# Patient Record
Sex: FEMALE | Race: WHITE | NOT HISPANIC OR LATINO | Employment: FULL TIME | URBAN - METROPOLITAN AREA
[De-identification: names, ages, dates, MRNs, and addresses within clinical notes are randomized per-mention and may not be internally consistent; named-entity substitution may affect disease eponyms.]

---

## 2018-06-18 ENCOUNTER — OFFICE VISIT (OUTPATIENT)
Dept: ORTHOPEDIC SURGERY | Facility: CLINIC | Age: 23
End: 2018-06-18

## 2018-06-18 VITALS — WEIGHT: 176.37 LBS | OXYGEN SATURATION: 99 % | HEIGHT: 63 IN | HEART RATE: 77 BPM | BODY MASS INDEX: 31.25 KG/M2

## 2018-06-18 DIAGNOSIS — M79.671 RIGHT FOOT PAIN: ICD-10-CM

## 2018-06-18 DIAGNOSIS — M21.6X1 ACQUIRED METATARSUS ADDUCTUS OF RIGHT FOOT: ICD-10-CM

## 2018-06-18 DIAGNOSIS — R52 PAIN: Primary | ICD-10-CM

## 2018-06-18 PROCEDURE — 99204 OFFICE O/P NEW MOD 45 MIN: CPT | Performed by: ORTHOPAEDIC SURGERY

## 2018-06-18 RX ORDER — LORATADINE 10 MG/1
10 TABLET ORAL DAILY
COMMUNITY

## 2018-06-18 NOTE — PROGRESS NOTES
"NEW PATIENT    Patient: Brittani Rashid  : 1995    Primary Care Provider: Wesley Alegre MD    Requesting Provider: As above    Pain of the Right Foot      History    Chief Complaint: right foot pain     History of Present Illness: this is an extremely pleasant 22 year old young woman here with her mother and brother.   She has a long history of bilateral bunions (adolescent) her brother also has bunions.  She had surgery on the right 16 and has not had much correction of the deformity, still has pain.  She has seen the podiatrist again, and they recommended another surgery.  She is here for a second opinion.  She rates the pain as 4/10, aching, \"annoying\"  She works in manufacturing.  She is otherwise healthy, quit smoking 3 years ago.  (excellent, I explained the risks of gangrene, etc)    No current outpatient prescriptions on file prior to visit.     No current facility-administered medications on file prior to visit.       Allergies   Allergen Reactions   • Penicillins Rash      History reviewed. No pertinent past medical history.  Past Surgical History:   Procedure Laterality Date   • FOOT SURGERY Right      -   • TONSILLECTOMY     • WRIST SURGERY Left     cyst removal      Family History   Problem Relation Age of Onset   • Hypertension Mother    • Hypertension Father    • Heart attack Father       Social History     Social History   • Marital status: Single     Spouse name: N/A   • Number of children: N/A   • Years of education: N/A     Occupational History   • Not on file.     Social History Main Topics   • Smoking status: Former Smoker     Packs/day: 0.50     Years: 3.00     Types: Cigarettes     Start date:      Quit date:    • Smokeless tobacco: Never Used   • Alcohol use Yes      Comment: occasional   • Drug use: No   • Sexual activity: Defer     Other Topics Concern   • Not on file     Social History Narrative   • No narrative on file        Review of Systems " "  Constitutional: Negative.    Eyes: Negative.    Respiratory: Negative.    Cardiovascular: Negative.    Gastrointestinal: Negative.    Endocrine: Negative.    Genitourinary: Negative.    Musculoskeletal: Positive for arthralgias and joint swelling.   Skin: Negative.    Allergic/Immunologic: Positive for environmental allergies and food allergies.   Neurological: Positive for headaches.   Hematological: Negative.    Psychiatric/Behavioral: Negative.        The following portions of the patient's history were reviewed and updated as appropriate: allergies, current medications, past family history, past medical history, past social history, past surgical history and problem list.    Physical Exam:   Pulse 77   Ht 160 cm (63\")   Wt 80 kg (176 lb 5.9 oz)   SpO2 99%   BMI 31.24 kg/m²   GENERAL: Body habitus: overweight    Lower extremity edema: Left: none; Right: none    Varicose veins:  Left: none; Right: none    Gait: normal     Mental Status:  awake and alert; oriented to person, place, and time    Voice:  clear  SKIN:  Normal and warm and dry    Hair Growth:  Right:normal; Left:  normal  NAILS: Toenails: normal  HEENT: Head: Normocephalic, atraumatic,  without obvious abnormality.  eye: normal external eye, no icterus  ears: normal external ears  nose: normal external nose  pharynx: dental hygiene adequate  PULM:  Repiratory effort normal  CV:  Dorsalis Pedis:  Right: 2+; Left:2+    Posterior Tibial: Right:2+; Left:2+    Capillary Refill:  Brisk  MSK:  Hand:right handed and sensation intact no significant laxity    Tibia:  Right:  non tender; Left:  non tender      Ankle:  Right: non tender, ROM  normal and symmetric and motor function  normal; Left:  non tender, ROM  normal and symmetric and motor function  normal      Foot:  Right:  healed very long dorsal-medial incision over 1st metatarsal and toe, tender.  great toe has 30 deg dorsiflex, 20 plantar with pain at dorsiflexoin.  bilateral HV/MTPV, angles fairly " symmetric on clinical exam; Left:  moderate HV/MTPV clinically symmetric with right, not tender      NEURO: Heel Walking:  Right:  normal; Left:  normal    Toe Walking:  Right:  normal- some soreness at max dorsiflexion in great toe; Left:  normal     [place]-Citlali 5.07 monofilament test: normal    Lower extremity sensation: intact     Reflexes:  Biceps:  Right:  1+; Left:  1+           Quads:  Right:  3+; Left:  2+           Ankle:  Right:  1+; Left:  1+      Calf Atrophy:none    Motor Function: all 5/5         Medical Decision Making    Data Review:   ordered and reviewed x-rays today and reviewed radiology resultsI reviewed the podiatry records    Assessment and Plan/ Diagnosis/Treatment options:   1. Pain- recurrent HV/MTPV  I reviewed the records and X-rays.  Unfortunately I do not think the deformity was fully corrected.  The technique used was an opening wedge of the 1st metatarsal and Akin of the great toe. The opening wedge was cut at an angle that would not allow  correction, and it does not look like there was any change in the alignment of the 1st metatarsal. The akin looks like it changed the HVIP, but the 1st MTPJ is in valgus, it is not corrected.  There is also a large amount of bone removed from the medial 1st metatarsal head.  The osteotomies look healed, hardware intact.    I had a long discussion with the patient and family.  I explained pediatric/adolescent bunions, the increased risk of recurrence.  I explained the different surgeries used for bunions.  I drew a picture on her foot explained the angles.  I explained that for pediatric/adolecent bunions one has to pick the surgery that gives the best correction.  In her case it does not look like the correction was achieved.  The difficulty now is that there is no easy/good revision.  The podiatrist told her they would do a distal osteotomy, and it looks like they would resect the base of the 1st phalanx.  I do NOT think this would be  "successful.  If it came to revision surgery, I would remove the proximal hardware and do a crescentic osteotomy, then realign the distal joint with soft tissue procedure.  However, this would have a significant rate of recurrence, because of the location of the incision (i would have to go through the existing incision) and the excess resection of the metatarsal head.  The ultimate revision surgery would be a fusion of the 1st MTPJ, but I would not do that in a 22 year old.      Because of the complexity and questionable outcome, I would recommend she put the surgery off as long as possible, her level of pain is fairly low now, it is an \"annoyance\" by her report.  I would not suggest surgery unless it affected her function more.  She and her family understood, questions asked and answered.  I will be happy to see her any time                      "

## 2024-07-08 ENCOUNTER — TRANSCRIBE ORDERS (OUTPATIENT)
Dept: NUTRITION | Facility: HOSPITAL | Age: 29
End: 2024-07-08
Payer: COMMERCIAL

## 2024-07-08 DIAGNOSIS — K51.919 ULCERATIVE COLITIS WITH COMPLICATION, UNSPECIFIED LOCATION: Primary | ICD-10-CM

## 2024-08-13 ENCOUNTER — HOSPITAL ENCOUNTER (OUTPATIENT)
Dept: NUTRITION | Facility: HOSPITAL | Age: 29
Setting detail: RECURRING SERIES
Discharge: HOME | End: 2024-08-13

## 2024-08-13 PROCEDURE — 97802 MEDICAL NUTRITION INDIV IN: CPT

## 2024-08-13 NOTE — CONSULTS
[facility] Nutrition Services          Initial 60 Minute Nutrition Visit    Date: 2024   Patient Name: Brittani Rashid  : 1995   MRN: [id number]   Referring Provider: Himanshu Quick MD    Reason for Visit:   Visit Format: Phone    Nutrition Assessment       Social History:   Social History     Socioeconomic History    Marital status: Single   Tobacco Use    Smoking status: Former     Current packs/day: 0.00     Average packs/day: 0.5 packs/day for 3.0 years (1.5 ttl pk-yrs)     Types: Cigarettes     Start date:      Quit date:      Years since quittin.6    Smokeless tobacco: Never   Substance and Sexual Activity    Alcohol use: Yes     Comment: occasional    Drug use: No    Sexual activity: Defer     Active Problem List:   Patient Active Problem List    Diagnosis     Acquired metatarsus adductus of right foot [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]       Current Medications:   Current Outpatient Medications:     loratadine (CLARITIN) 10 MG tablet, Take 10 mg by mouth Daily., Disp: , Rfl:     Labs: n/a    Hunger Vital Sign Food Insecurity Assessment:  Within the past 12 months I/we worried whether our food would run out before I/we got money to buy more: no   Within the past 12 months the food I/we bought just didn't last and I/we didn't have money to get more: no   Use of food assistance programs (WIC, food stamps, food rdz) no       Food & Nutrition Related History       Food Allergies: n/a  Food Intolerances: n/a  Food Behavior: none  Nutrition Impact Symptoms: abdominal pain  Gastrointestinal conditions that impact intake or food choices: Ulcerative Colitis  Details at home: n/a  Who prepares most meals: patient   Who does grocery shopping: patient   How many meals are purchased from fast food/sit down restaurants per week: 1  Difficulty chewin - Normal  Difficulty swallowin - Normal  Diet requirement related to personal preference or cultural belief:  low fodmap  History of eating  "disorder/disordered eating habits: None  Language/communication details: English  Barriers to learning: No barriers identified at this time        Anthropometrics      Height:   Ht Readings from Last 1 Encounters:   06/18/18 160 cm (63\")     Weight:   Wt Readings from Last 3 Encounters:   06/18/18 80 kg (176 lb 5.9 oz)     BMI: There is no height or weight on file to calculate BMI.   Weight Change: n/a     Physical Activity         Physical activity comments: not discussed     Estimated Needs     Estimated Energy Needs: n/a    Estimated Protein Needs: n/a     Estimated Fluid Needs: n/a     Discussion / Education      Patient is a 28 year old female referred for ulcerative colitis. She states that she was diagnosed with ulcerative colitis back in December of 2019. She states that she has been follow the low fodmap diet for around 2 years now. She states that she still experiences flare ups and has cramping and other GI symptoms. She states that she does not eat a variety of foods. She states that she is scared to eat some foods because of abdominal pain. She states that she is not strict with the low fodmap diet and has not received any education on it previously. She states that she takes a vitamin B12 and D3 supplement due to deficiency.     Education provided today focused on the low fodmap diet. Discussed what the low fodmap diet is and how it improves GI symptoms. Discussed the two phases of the low fodmap diet, elimination phase and the reintroduction phase. Provided patient with a list of foods that are considered to be high and low fodmap foods. Discussed following the elimination phase for 3 weeks and then slowly start reintroducing foods to target specefic foods causing symptoms. Encouraged her to keep a food journal and write down any symptoms that occur. Discussed the importance of hydration and fiber while on the low fodmap diet. Discussed limiting fatty and spicy foods. Discussed if she does not " experience any success with low fodmap diet we will discuss the Mediterranean diet as an alternative. Encouraged patient to reach out with any additional questions or concerns.       Assessment of patient engagement: Engaged    Measurement of understanding: Patient verbalized understanding    Resources Provided: [facility] Fodmap Basic       Goal (s)      Goal 1: Follow the low fodmap diet          Plan of Care     PES Statement:   Food causing negative symptoms related to ulcerative colitis as evidenced by gastrointestinal symptoms.     Follow Up Visit      Follow Up:   October 3 at 4pm    Total of 60 minutes spent with patient on nutrition counseling. Education based on Academy of Nutrition and Dietetics guidelines. Patient was provided with RD's contact information. Thank you for this referral.

## 2025-08-20 ENCOUNTER — OFFICE VISIT (OUTPATIENT)
Dept: INTERNAL MEDICINE | Age: 30
End: 2025-08-20
Payer: COMMERCIAL

## 2025-08-20 ENCOUNTER — LAB (OUTPATIENT)
Dept: INTERNAL MEDICINE | Age: 30
End: 2025-08-20
Payer: COMMERCIAL

## 2025-08-20 VITALS
DIASTOLIC BLOOD PRESSURE: 64 MMHG | HEIGHT: 64 IN | WEIGHT: 191 LBS | OXYGEN SATURATION: 98 % | HEART RATE: 91 BPM | BODY MASS INDEX: 32.61 KG/M2 | SYSTOLIC BLOOD PRESSURE: 90 MMHG

## 2025-08-20 DIAGNOSIS — J45.20 MILD INTERMITTENT ASTHMA WITHOUT COMPLICATION: ICD-10-CM

## 2025-08-20 DIAGNOSIS — E55.9 VITAMIN D DEFICIENCY: ICD-10-CM

## 2025-08-20 DIAGNOSIS — K51.919 ULCERATIVE COLITIS WITH COMPLICATION, UNSPECIFIED LOCATION: Primary | ICD-10-CM

## 2025-08-20 DIAGNOSIS — E53.8 B12 DEFICIENCY: ICD-10-CM

## 2025-08-20 DIAGNOSIS — Z00.00 HEALTHCARE MAINTENANCE: Primary | ICD-10-CM

## 2025-08-20 LAB
ALBUMIN SERPL-MCNC: 4.1 G/DL (ref 3.5–5.2)
ALBUMIN/GLOB SERPL: 1.2 G/DL
ALP SERPL-CCNC: 65 U/L (ref 39–117)
ALT SERPL W P-5'-P-CCNC: 18 U/L (ref 1–33)
ANION GAP SERPL CALCULATED.3IONS-SCNC: 11.8 MMOL/L (ref 5–15)
AST SERPL-CCNC: 18 U/L (ref 1–32)
BILIRUB SERPL-MCNC: 0.4 MG/DL (ref 0–1.2)
BUN SERPL-MCNC: 12 MG/DL (ref 6–20)
BUN/CREAT SERPL: 15.4 (ref 7–25)
CALCIUM SPEC-SCNC: 9.5 MG/DL (ref 8.6–10.5)
CHLORIDE SERPL-SCNC: 105 MMOL/L (ref 98–107)
CO2 SERPL-SCNC: 23.2 MMOL/L (ref 22–29)
CREAT SERPL-MCNC: 0.78 MG/DL (ref 0.57–1)
DEPRECATED RDW RBC AUTO: 42.1 FL (ref 37–54)
EGFRCR SERPLBLD CKD-EPI 2021: 105.6 ML/MIN/1.73
ERYTHROCYTE [DISTWIDTH] IN BLOOD BY AUTOMATED COUNT: 12.8 % (ref 12.3–15.4)
GLOBULIN UR ELPH-MCNC: 3.3 GM/DL
GLUCOSE SERPL-MCNC: 84 MG/DL (ref 65–99)
HCT VFR BLD AUTO: 46.2 % (ref 34–46.6)
HGB BLD-MCNC: 15 G/DL (ref 12–15.9)
MCH RBC QN AUTO: 29 PG (ref 26.6–33)
MCHC RBC AUTO-ENTMCNC: 32.5 G/DL (ref 31.5–35.7)
MCV RBC AUTO: 89.4 FL (ref 79–97)
PLATELET # BLD AUTO: 314 10*3/MM3 (ref 140–450)
PMV BLD AUTO: 9.3 FL (ref 6–12)
POTASSIUM SERPL-SCNC: 4 MMOL/L (ref 3.5–5.2)
PROT SERPL-MCNC: 7.4 G/DL (ref 6–8.5)
RBC # BLD AUTO: 5.17 10*6/MM3 (ref 3.77–5.28)
SODIUM SERPL-SCNC: 140 MMOL/L (ref 136–145)
WBC NRBC COR # BLD AUTO: 8.26 10*3/MM3 (ref 3.4–10.8)

## 2025-08-20 PROCEDURE — 80053 COMPREHEN METABOLIC PANEL: CPT | Performed by: INTERNAL MEDICINE

## 2025-08-20 PROCEDURE — 82306 VITAMIN D 25 HYDROXY: CPT | Performed by: INTERNAL MEDICINE

## 2025-08-20 PROCEDURE — 82607 VITAMIN B-12: CPT | Performed by: INTERNAL MEDICINE

## 2025-08-20 PROCEDURE — 36415 COLL VENOUS BLD VENIPUNCTURE: CPT | Performed by: INTERNAL MEDICINE

## 2025-08-20 PROCEDURE — 99204 OFFICE O/P NEW MOD 45 MIN: CPT | Performed by: INTERNAL MEDICINE

## 2025-08-20 PROCEDURE — 85027 COMPLETE CBC AUTOMATED: CPT | Performed by: INTERNAL MEDICINE

## 2025-08-20 RX ORDER — GUSELKUMAB 200 MG/2ML
2 INJECTION SUBCUTANEOUS
COMMUNITY
Start: 2025-05-20

## 2025-08-20 RX ORDER — MULTIVIT-MIN/IRON/FOLIC ACID/K 18-600-40
2000 CAPSULE ORAL DAILY
COMMUNITY
Start: 2025-05-14

## 2025-08-20 RX ORDER — CYANOCOBALAMIN 1000 UG/ML
1000 INJECTION, SOLUTION INTRAMUSCULAR; SUBCUTANEOUS
COMMUNITY
Start: 2025-06-06 | End: 2025-08-25 | Stop reason: SDUPTHER

## 2025-08-20 RX ORDER — BENZOYL PEROXIDE 100 MG/ML
LIQUID TOPICAL
COMMUNITY
Start: 2025-07-10 | End: 2025-08-25 | Stop reason: SDUPTHER

## 2025-08-20 RX ORDER — ALBUTEROL SULFATE 90 UG/1
2 INHALANT RESPIRATORY (INHALATION) EVERY 4 HOURS PRN
Qty: 18 G | Refills: 5 | Status: SHIPPED | OUTPATIENT
Start: 2025-08-20

## 2025-08-21 ENCOUNTER — PATIENT ROUNDING (BHMG ONLY) (OUTPATIENT)
Dept: INTERNAL MEDICINE | Age: 30
End: 2025-08-21
Payer: COMMERCIAL

## 2025-08-21 LAB
25(OH)D3 SERPL-MCNC: 25.1 NG/ML (ref 30–100)
VIT B12 BLD-MCNC: 379 PG/ML (ref 211–946)

## 2025-08-25 RX ORDER — CYANOCOBALAMIN 1000 UG/ML
1000 INJECTION, SOLUTION INTRAMUSCULAR; SUBCUTANEOUS
Qty: 1 ML | Refills: 11 | Status: SHIPPED | OUTPATIENT
Start: 2025-08-25

## 2025-08-25 RX ORDER — BENZOYL PEROXIDE 100 MG/ML
LIQUID TOPICAL
Qty: 227 G | Refills: 5 | Status: SHIPPED | OUTPATIENT
Start: 2025-08-25